# Patient Record
Sex: MALE | Race: BLACK OR AFRICAN AMERICAN | NOT HISPANIC OR LATINO | Employment: FULL TIME | URBAN - METROPOLITAN AREA
[De-identification: names, ages, dates, MRNs, and addresses within clinical notes are randomized per-mention and may not be internally consistent; named-entity substitution may affect disease eponyms.]

---

## 2020-06-02 ENCOUNTER — NURSE TRIAGE (OUTPATIENT)
Dept: ADMINISTRATIVE | Facility: CLINIC | Age: 60
End: 2020-06-02

## 2020-06-02 NOTE — TELEPHONE ENCOUNTER
Reason for Disposition   Second attempt to contact family AND no contact made. Phone number verified.    Protocols used: NO CONTACT OR DUPLICATE CONTACT CALL-[facility]-[place]

## 2020-06-02 NOTE — TELEPHONE ENCOUNTER
Made second attempt to contact patient via [facility] and pt did not answer. No further calls required.

## 2020-06-02 NOTE — TELEPHONE ENCOUNTER
Attempted to call patient on behalf of [facility]. Pt did not answer on day 14. No contact protocol in place.     Reason for Disposition   No answer.  First attempt to contact caller.  Follow-up call scheduled within 15 minutes.    Additional Information   Negative: Caller has already spoken with the PCP (or office), and has no further questions   Negative: Caller has already spoken with another triager and has no further questions   Negative: Caller has already spoken with another triager or PCP (or office), and has further questions and triager able to answer questions.    Protocols used: NO CONTACT OR DUPLICATE CONTACT CALL-[facility]-[place]

## 2020-06-24 PROBLEM — K40.90 INGUINAL HERNIA: Status: ACTIVE | Noted: 2020-06-24

## 2020-06-24 PROBLEM — K42.9 UMBILICAL HERNIA WITHOUT OBSTRUCTION AND WITHOUT GANGRENE: Status: ACTIVE | Noted: 2020-06-24

## 2020-06-24 PROBLEM — Z21 ASYMPTOMATIC HIV INFECTION: Status: ACTIVE | Noted: 2020-06-24

## 2020-09-15 PROBLEM — D50.9 IRON DEFICIENCY ANEMIA: Status: ACTIVE | Noted: 2017-12-04

## 2020-09-15 PROBLEM — R68.89 FLU-LIKE SYMPTOMS: Status: ACTIVE | Noted: 2018-01-24

## 2020-09-15 PROBLEM — K12.2 UVULITIS: Status: ACTIVE | Noted: 2020-09-15

## 2020-09-15 PROBLEM — T78.3XXA ANGIO-EDEMA: Status: ACTIVE | Noted: 2020-09-15

## 2020-09-15 PROBLEM — R42 VERTIGO: Status: ACTIVE | Noted: 2018-01-24

## 2020-09-15 PROBLEM — J06.9 UPPER RESPIRATORY TRACT INFECTION: Status: ACTIVE | Noted: 2018-01-24

## 2021-03-15 PROBLEM — Z86.19 HISTORY OF SYPHILIS: Status: ACTIVE | Noted: 2021-03-15

## 2021-03-15 PROBLEM — T78.3XXA ANGIO-EDEMA: Status: RESOLVED | Noted: 2020-09-15 | Resolved: 2021-03-15

## 2021-03-15 PROBLEM — R68.89 FLU-LIKE SYMPTOMS: Status: RESOLVED | Noted: 2018-01-24 | Resolved: 2021-03-15

## 2021-03-15 PROBLEM — J06.9 UPPER RESPIRATORY TRACT INFECTION: Status: RESOLVED | Noted: 2018-01-24 | Resolved: 2021-03-15

## 2022-05-03 PROBLEM — R03.0 ELEVATED BP WITHOUT DIAGNOSIS OF HYPERTENSION: Status: ACTIVE | Noted: 2022-05-03

## 2023-03-28 ENCOUNTER — PATIENT MESSAGE (OUTPATIENT)
Dept: RESEARCH | Facility: HOSPITAL | Age: 63
End: 2023-03-28